# Patient Record
Sex: MALE | Race: WHITE | NOT HISPANIC OR LATINO | URBAN - NONMETROPOLITAN AREA
[De-identification: names, ages, dates, MRNs, and addresses within clinical notes are randomized per-mention and may not be internally consistent; named-entity substitution may affect disease eponyms.]

---

## 2021-03-31 ENCOUNTER — IMPORTED ENCOUNTER (OUTPATIENT)
Dept: URBAN - NONMETROPOLITAN AREA CLINIC 1 | Facility: CLINIC | Age: 36
End: 2021-03-31

## 2021-03-31 PROCEDURE — 92004 COMPRE OPH EXAM NEW PT 1/>: CPT

## 2021-03-31 PROCEDURE — 92310 CONTACT LENS FITTING OU: CPT

## 2021-03-31 PROCEDURE — 92015 DETERMINE REFRACTIVE STATE: CPT

## 2021-03-31 NOTE — PATIENT DISCUSSION
Astigmatism OUDiscussed refractive status with patientNew glasses and contact Rx given today. Discussed proper care replacement and hygiene. Continue to monitor.

## 2022-04-09 ASSESSMENT — TONOMETRY
OD_IOP_MMHG: 16
OS_IOP_MMHG: 16

## 2022-04-09 ASSESSMENT — KERATOMETRY
OD_AXISANGLE_DEGREES: 081
OS_K1POWER_DIOPTERS: 41.50
OD_K1POWER_DIOPTERS: 42.00
OS_AXISANGLE_DEGREES: 092
OD_K2POWER_DIOPTERS: 43.00
OS_K2POWER_DIOPTERS: 43.00

## 2022-04-09 ASSESSMENT — VISUAL ACUITY
OD_SC: 20/20
OS_SC: 20/20

## 2024-04-16 ENCOUNTER — NEW PATIENT (OUTPATIENT)
Dept: RURAL CLINIC 3 | Facility: CLINIC | Age: 39
End: 2024-04-16

## 2024-04-16 DIAGNOSIS — H52.223: ICD-10-CM

## 2024-04-16 DIAGNOSIS — H52.03: ICD-10-CM

## 2024-04-16 PROCEDURE — 92004 COMPRE OPH EXAM NEW PT 1/>: CPT

## 2024-04-16 PROCEDURE — 92015 DETERMINE REFRACTIVE STATE: CPT

## 2024-04-16 PROCEDURE — 92310-E CONTACT LENS FITTING ESTABLISH PATIENT

## 2024-04-16 ASSESSMENT — TONOMETRY
OD_IOP_MMHG: 17
OS_IOP_MMHG: 17

## 2024-04-16 ASSESSMENT — VISUAL ACUITY
OD_CC: 20/20
OU_CC: 20/20
OS_CC: 20/20

## 2024-04-16 ASSESSMENT — KERATOMETRY
OD_K2POWER_DIOPTERS: 43.00
OS_AXISANGLE_DEGREES: 092
OS_K1POWER_DIOPTERS: 41.50
OS_K2POWER_DIOPTERS: 43.00
OD_AXISANGLE_DEGREES: 081
OD_K1POWER_DIOPTERS: 42.00

## 2024-05-28 ENCOUNTER — ESTABLISHED PATIENT (OUTPATIENT)
Dept: RURAL CLINIC 3 | Facility: CLINIC | Age: 39
End: 2024-05-28

## 2024-05-28 DIAGNOSIS — H52.223: ICD-10-CM

## 2024-05-28 DIAGNOSIS — H52.03: ICD-10-CM

## 2024-05-28 PROCEDURE — 92015GRNC REFRACTION GLASSES RECHECK NO CHARGE

## 2024-05-28 ASSESSMENT — KERATOMETRY
OD_K2POWER_DIOPTERS: 43.00
OD_AXISANGLE_DEGREES: 081
OS_K2POWER_DIOPTERS: 43.00
OS_K1POWER_DIOPTERS: 41.50
OD_K1POWER_DIOPTERS: 42.00
OS_AXISANGLE_DEGREES: 092

## 2024-05-28 ASSESSMENT — VISUAL ACUITY
OS_CC: 20/15-1
OU_CC: 20/15-1
OD_CC: 20/20

## 2025-04-22 ENCOUNTER — COMPREHENSIVE EXAM (OUTPATIENT)
Age: 40
End: 2025-04-22

## 2025-04-22 DIAGNOSIS — H52.223: ICD-10-CM

## 2025-04-22 DIAGNOSIS — H52.03: ICD-10-CM

## 2025-04-22 PROCEDURE — 92015 DETERMINE REFRACTIVE STATE: CPT

## 2025-04-22 PROCEDURE — 92014 COMPRE OPH EXAM EST PT 1/>: CPT
